# Patient Record
Sex: MALE | Race: WHITE
[De-identification: names, ages, dates, MRNs, and addresses within clinical notes are randomized per-mention and may not be internally consistent; named-entity substitution may affect disease eponyms.]

---

## 2022-07-27 ENCOUNTER — HOSPITAL ENCOUNTER (INPATIENT)
Dept: HOSPITAL 95 - ER | Age: 37
LOS: 2 days | Discharge: HOME | DRG: 100 | End: 2022-07-29
Attending: INTERNAL MEDICINE | Admitting: INTERNAL MEDICINE
Payer: MEDICARE

## 2022-07-27 VITALS — WEIGHT: 291.89 LBS | BODY MASS INDEX: 39.54 KG/M2 | HEIGHT: 72 IN

## 2022-07-27 DIAGNOSIS — R73.9: ICD-10-CM

## 2022-07-27 DIAGNOSIS — J96.01: ICD-10-CM

## 2022-07-27 DIAGNOSIS — R56.9: Primary | ICD-10-CM

## 2022-07-27 DIAGNOSIS — D72.828: ICD-10-CM

## 2022-07-27 DIAGNOSIS — F84.0: ICD-10-CM

## 2022-07-27 DIAGNOSIS — Z20.822: ICD-10-CM

## 2022-07-27 DIAGNOSIS — E87.2: ICD-10-CM

## 2022-07-27 DIAGNOSIS — G93.41: ICD-10-CM

## 2022-07-27 LAB
ALBUMIN SERPL BCP-MCNC: 4 G/DL (ref 3.4–5)
ALBUMIN/GLOB SERPL: 0.8 {RATIO} (ref 0.8–1.8)
ALT SERPL W P-5'-P-CCNC: 42 U/L (ref 12–78)
ANION GAP SERPL CALCULATED.4IONS-SCNC: 18 MMOL/L (ref 6–16)
ANION GAP SERPL CALCULATED.4IONS-SCNC: 9 MMOL/L (ref 6–16)
AST SERPL W P-5'-P-CCNC: 27 U/L (ref 12–37)
BASOPHILS # BLD AUTO: 0.05 K/MM3 (ref 0–0.23)
BASOPHILS NFR BLD AUTO: 0 % (ref 0–2)
BILIRUB SERPL-MCNC: 0.3 MG/DL (ref 0.1–1)
BUN SERPL-MCNC: 14 MG/DL (ref 8–24)
BUN SERPL-MCNC: 15 MG/DL (ref 8–24)
CALCIUM SERPL-MCNC: 8.9 MG/DL (ref 8.5–10.1)
CALCIUM SERPL-MCNC: 8.9 MG/DL (ref 8.5–10.1)
CHLORIDE SERPL-SCNC: 108 MMOL/L (ref 98–108)
CHLORIDE SERPL-SCNC: 114 MMOL/L (ref 98–108)
CO2 SERPL-SCNC: 11 MMOL/L (ref 21–32)
CO2 SERPL-SCNC: 19 MMOL/L (ref 21–32)
CREAT SERPL-MCNC: 0.97 MG/DL (ref 0.6–1.2)
CREAT SERPL-MCNC: 1.11 MG/DL (ref 0.6–1.2)
DEPRECATED RDW RBC AUTO: 42.4 FL (ref 35.1–46.3)
EOSINOPHIL # BLD AUTO: 0 K/MM3 (ref 0–0.68)
EOSINOPHIL NFR BLD AUTO: 0 % (ref 0–6)
ERYTHROCYTE [DISTWIDTH] IN BLOOD BY AUTOMATED COUNT: 12.2 % (ref 11.7–14.2)
FLUAV RNA SPEC QL NAA+PROBE: NEGATIVE
FLUBV RNA SPEC QL NAA+PROBE: NEGATIVE
GLOBULIN SER CALC-MCNC: 4.8 G/DL (ref 2.2–4)
GLUCOSE SERPL-MCNC: 110 MG/DL (ref 70–99)
GLUCOSE SERPL-MCNC: 306 MG/DL (ref 70–99)
GLUCOSE UR-MCNC: (no result) MG/DL
HCT VFR BLD AUTO: 45.6 % (ref 37–53)
HGB BLD-MCNC: 15.1 G/DL (ref 13.5–17.5)
IMM GRANULOCYTES # BLD AUTO: 0.39 K/MM3 (ref 0–0.1)
IMM GRANULOCYTES NFR BLD AUTO: 2 % (ref 0–1)
KETONES UR STRIP-MCNC: (no result) MG/DL
LYMPHOCYTES # BLD AUTO: 2.27 K/MM3 (ref 0.84–5.2)
LYMPHOCYTES NFR BLD AUTO: 9 % (ref 21–46)
MAGNESIUM SERPL-MCNC: 2.6 MG/DL (ref 1.6–2.4)
MCHC RBC AUTO-ENTMCNC: 33.1 G/DL (ref 31.5–36.5)
MCV RBC AUTO: 93 FL (ref 80–100)
MONOCYTES # BLD AUTO: 0.69 K/MM3 (ref 0.16–1.47)
MONOCYTES NFR BLD AUTO: 3 % (ref 4–13)
NEUTROPHILS # BLD AUTO: 22.36 K/MM3 (ref 1.96–9.15)
NEUTROPHILS NFR BLD AUTO: 87 % (ref 41–73)
NRBC # BLD AUTO: 0 K/MM3 (ref 0–0.02)
NRBC BLD AUTO-RTO: 0 /100 WBC (ref 0–0.2)
PH BLDV: 7.2 [PH] (ref 7.34–7.37)
PLATELET # BLD AUTO: 494 K/MM3 (ref 150–400)
POTASSIUM SERPL-SCNC: 4 MMOL/L (ref 3.5–5.5)
POTASSIUM SERPL-SCNC: 4.5 MMOL/L (ref 3.5–5.5)
PROT SERPL-MCNC: 8.8 G/DL (ref 6.4–8.2)
PROT UR STRIP-MCNC: (no result) MG/DL
RBC #/AREA URNS HPF: (no result) /HPF (ref 0–2)
RSV RNA SPEC QL NAA+PROBE: NEGATIVE
SARS-COV-2 RNA RESP QL NAA+PROBE: NEGATIVE
SODIUM SERPL-SCNC: 137 MMOL/L (ref 136–145)
SODIUM SERPL-SCNC: 142 MMOL/L (ref 136–145)
SP GR SPEC: 1.02 (ref 1–1.02)
UROBILINOGEN UR STRIP-MCNC: (no result) MG/DL
WBC #/AREA URNS HPF: (no result) /HPF (ref 0–5)

## 2022-07-27 PROCEDURE — A9270 NON-COVERED ITEM OR SERVICE: HCPCS

## 2022-07-27 PROCEDURE — G0378 HOSPITAL OBSERVATION PER HR: HCPCS

## 2022-07-27 NOTE — NUR
ADMIT/SHIFT SUMMARY
 
PATIENT ADMITTED FROM ER AT 1730. PATIENT SETTLED INTO ROOM. FATHER AT BEDSIDE
WHEN TRANSFERED. PATIENT SHOWS NO SIGN OF PAIN, NOT GRIMACING OR GROANING, NOT
GRABBING AT ANYTHING. PATIENT IS NON VERBAL AUTISTIC. FATHER ANSWERED
QUESTIONS AND COMPLETED ADMISSION WITH THIS RN. PATIENT IS BEDREST AND NPO,
EXCEPT FOR MEDS AND ICE CHIPS. PATIENT OCCASSIONALLY MAKES EYE CONTACT.
PATIENT DOES OPEN EYE TO VERBAL STIMULI. POWELL IS PATENT AND DRAINING. PATIENT
DOES NOT FOLLOW DIRECTIONS WELL, BUT IS COOPERATIVE. PATIENT DID NOT TRACK
PENLIGHT WITH EYES. PATIENT HAD WITNESSED SEIZURE BY PARENTS THIS AM, EEG
ORDERED. PATIENT IS PLEASANT AND COOPERATIVE WITH CARE. CHARGE NURSE GAVE
PERMISSION FOR PARENT TO STAY THE NIGHT WITH PATIENT. LACTIC ACID 2.1, THIS IS
DOWN FROM EARLIER.

## 2022-07-28 LAB
ALBUMIN SERPL BCP-MCNC: 3.3 G/DL (ref 3.4–5)
ALBUMIN/GLOB SERPL: 0.9 {RATIO} (ref 0.8–1.8)
ALT SERPL W P-5'-P-CCNC: 30 U/L (ref 12–78)
ANION GAP SERPL CALCULATED.4IONS-SCNC: 9 MMOL/L (ref 6–16)
AST SERPL W P-5'-P-CCNC: 28 U/L (ref 12–37)
BASOPHILS # BLD AUTO: 0.04 K/MM3 (ref 0–0.23)
BASOPHILS NFR BLD AUTO: 0 % (ref 0–2)
BILIRUB SERPL-MCNC: 0.6 MG/DL (ref 0.1–1)
BUN SERPL-MCNC: 16 MG/DL (ref 8–24)
CALCIUM SERPL-MCNC: 8.6 MG/DL (ref 8.5–10.1)
CHLORIDE SERPL-SCNC: 116 MMOL/L (ref 98–108)
CO2 SERPL-SCNC: 20 MMOL/L (ref 21–32)
CREAT SERPL-MCNC: 1.61 MG/DL (ref 0.6–1.2)
DEPRECATED RDW RBC AUTO: 42.6 FL (ref 35.1–46.3)
EOSINOPHIL # BLD AUTO: 0 K/MM3 (ref 0–0.68)
EOSINOPHIL NFR BLD AUTO: 0 % (ref 0–6)
ERYTHROCYTE [DISTWIDTH] IN BLOOD BY AUTOMATED COUNT: 12.6 % (ref 11.7–14.2)
GLOBULIN SER CALC-MCNC: 3.7 G/DL (ref 2.2–4)
GLUCOSE SERPL-MCNC: 103 MG/DL (ref 70–99)
HCT VFR BLD AUTO: 39.4 % (ref 37–53)
HGB BLD-MCNC: 13.2 G/DL (ref 13.5–17.5)
IMM GRANULOCYTES # BLD AUTO: 0.09 K/MM3 (ref 0–0.1)
IMM GRANULOCYTES NFR BLD AUTO: 1 % (ref 0–1)
LYMPHOCYTES # BLD AUTO: 2.26 K/MM3 (ref 0.84–5.2)
LYMPHOCYTES NFR BLD AUTO: 12 % (ref 21–46)
MCHC RBC AUTO-ENTMCNC: 33.5 G/DL (ref 31.5–36.5)
MCV RBC AUTO: 92 FL (ref 80–100)
MONOCYTES # BLD AUTO: 1.96 K/MM3 (ref 0.16–1.47)
MONOCYTES NFR BLD AUTO: 10 % (ref 4–13)
NEUTROPHILS # BLD AUTO: 14.75 K/MM3 (ref 1.96–9.15)
NEUTROPHILS NFR BLD AUTO: 77 % (ref 41–73)
NRBC # BLD AUTO: 0 K/MM3 (ref 0–0.02)
NRBC BLD AUTO-RTO: 0 /100 WBC (ref 0–0.2)
PLATELET # BLD AUTO: 316 K/MM3 (ref 150–400)
POTASSIUM SERPL-SCNC: 3.9 MMOL/L (ref 3.5–5.5)
PROT SERPL-MCNC: 7 G/DL (ref 6.4–8.2)
SODIUM SERPL-SCNC: 145 MMOL/L (ref 136–145)

## 2022-07-28 NOTE — NUR
SHIFT SUMMARY
 
 PT HAS BEEN RESTING ALL SHIFT. BUT OPENS EYES AND ANSWERS YES AND NO WHEN RN
IS IN THE ROOM. MOM IS AT THE BEDSIDE. PT DID HAVE SOME NAUSEA DNA VOMITING
AROUND 0100 MEDICATED PER EMAR. PT BECAME AGITATED AROUND 0300 AND WANTED TO
REMOVED THE POWELL AND KEPT REPEATING "PEE" THIS RN AND MOM TRIED TO EXPLAIN
THE CATHETER TO HIM AND THIS RN PHONED THE DR AND GOT AN ORDER TO REMOVE HIS
CATHETER THE NEXT TIME HE HAS AN ISSUE. HIS MOTHER WORRIES THAT SHOULD THE
CATHETER BE TAKEN OUT HE MIGHT REFUSE TO USE THE URINAL DUE TO SENSORY ISSUES
AND TRY TO GET UP. CATHETER LEFT UNTIL PT EXPRESSES DISCOMFORT PER MOTHERS
REQUEST. PT IS CURRENTLY RESTING WITH MO AT THE BEDSIDE. CALL LIGHT INREACH
AND BED INTHE LOWEST POSITION

## 2022-07-28 NOTE — NUR
REPORT FROM [facility]ORES, NO DISTRESS, CAN ANSWER YES AND NO QUESTIONS, EEG DONE
THIS AM, MOTHER AT BEDSIDE NOW, PATIENT SAYS NO WHEN ASKED IF HE NEEDS THE
BATHROOM, SLEEPING NOW, NPO EXCELT SIPS AND CHIPS AND MEDICATIONS

## 2022-07-28 NOTE — NUR
UNABLE TO MAKE NEEDS CLEARLY KNOWN, AMSWERS YES AND NO. NONVERBAL AUTISM,
MOTHE AND FATHER AT BEDSIDE, NOT IMPULSIVE, AMBULATED IN HALLS WITH OT, STEADY
GAIT, ONE PERSON STANDY BY ASSIST TO TRANSFER WITH MINIMAL QUIES. LUNG SOUND
DIM, CXRAY 2V DONE, ABD DISTENDED AND FIRM, PATIENTS NORMAL, INCONTINENT AND
CONTINENT AT TIMES, APPETITE INCREASING, NUTRITION CONSULT, HEART RATE NSR, NO
BRADYCARDIA TODAY, NO MOBILITY ISSUES. CALL LIGHT WITH IN REACH, FATHER AT
BEDSIDE, WILL RELAY TO PM RN, ABRIL

## 2022-07-29 LAB
ANION GAP SERPL CALCULATED.4IONS-SCNC: 7 MMOL/L (ref 6–16)
BASOPHILS # BLD AUTO: 0.05 K/MM3 (ref 0–0.23)
BASOPHILS NFR BLD AUTO: 0 % (ref 0–2)
BUN SERPL-MCNC: 16 MG/DL (ref 8–24)
CALCIUM SERPL-MCNC: 8.7 MG/DL (ref 8.5–10.1)
CHLORIDE SERPL-SCNC: 116 MMOL/L (ref 98–108)
CO2 SERPL-SCNC: 23 MMOL/L (ref 21–32)
CREAT SERPL-MCNC: 1.49 MG/DL (ref 0.6–1.2)
DEPRECATED RDW RBC AUTO: 43.3 FL (ref 35.1–46.3)
EOSINOPHIL # BLD AUTO: 0 K/MM3 (ref 0–0.68)
EOSINOPHIL NFR BLD AUTO: 0 % (ref 0–6)
ERYTHROCYTE [DISTWIDTH] IN BLOOD BY AUTOMATED COUNT: 12.6 % (ref 11.7–14.2)
GLUCOSE SERPL-MCNC: 101 MG/DL (ref 70–99)
HCT VFR BLD AUTO: 39.5 % (ref 37–53)
HGB BLD-MCNC: 13 G/DL (ref 13.5–17.5)
IMM GRANULOCYTES # BLD AUTO: 0.05 K/MM3 (ref 0–0.1)
IMM GRANULOCYTES NFR BLD AUTO: 0 % (ref 0–1)
LYMPHOCYTES # BLD AUTO: 2.72 K/MM3 (ref 0.84–5.2)
LYMPHOCYTES NFR BLD AUTO: 20 % (ref 21–46)
MCHC RBC AUTO-ENTMCNC: 32.9 G/DL (ref 31.5–36.5)
MCV RBC AUTO: 94 FL (ref 80–100)
MONOCYTES # BLD AUTO: 1.21 K/MM3 (ref 0.16–1.47)
MONOCYTES NFR BLD AUTO: 9 % (ref 4–13)
NEUTROPHILS # BLD AUTO: 9.65 K/MM3 (ref 1.96–9.15)
NEUTROPHILS NFR BLD AUTO: 71 % (ref 41–73)
NRBC # BLD AUTO: 0 K/MM3 (ref 0–0.02)
NRBC BLD AUTO-RTO: 0 /100 WBC (ref 0–0.2)
PLATELET # BLD AUTO: 297 K/MM3 (ref 150–400)
POTASSIUM SERPL-SCNC: 3.9 MMOL/L (ref 3.5–5.5)
SODIUM SERPL-SCNC: 146 MMOL/L (ref 136–145)

## 2022-07-29 NOTE — NUR
PATIENT DISCHARGED TO HOME WITH HIS MOTHER, FATHER DRIVING. IV SALINE LOCK
REMOVED WITHOUT INCIDENT. MOTHER VERBALIZED UNDERSTANDING OF D/C INSTRUCTIONS.
OFF UNIT AT 1114 VIA W/C. NO PERSONAL BELONGINGS LEFT BEHIND IN ROOM.

## 2022-07-29 NOTE — NUR
SHIFT SUMMARY
 
PT IS MUCH MORE ALERT THIS EVENING. HE IS AWAKE AND LOOKING AROUND, HE
RESPONDS YES AND NO. MOM AND DAD WERE BOTH IN THE ROOM THIS EVENING. PT WAS
ABLE TO GET UP AND MOVE TO THE CHAIR, DRESS HIMSELF, AND TOLERATE A SNACK AND
MEDICATIONS. FAMILY IS HOPEFUL THAT HE WILL BE DISCHARGES TOMORROW. MOM AT
BEDSIDE CURRENTLY AND BED IN LOWEST POSITION WITH CALL LIGHT IN REACH